# Patient Record
(demographics unavailable — no encounter records)

---

## 2025-07-15 NOTE — DATA REVIEWED
[de-identified] : Exam: MRI BRAIN Order#: [id number]    CLINICAL INDICATIONS: stroke  COMPARISON: Head CT dated 12/15/2023  TECHNIQUE: MRI brain: Multiplanar, multisequence MR imaging of the brain are obtained without the administration of intravenous Gadavist contrast.  FINDINGS: There is no abnormal restricted diffusion to suggest acute infarction. Scattered periventricular and subcortical white matter T2 /FLAIR hyperintensities are seen without mass effect, nonspecific, likely representing mild chronic microvascular changes. Normal T2 flow-voids are seen within the intracranial vasculature. The lateral ventricles and cortical sulci are age-appropriate in size and configuration. There is no mass, mass effect, or extra-axial fluid collection. There is no susceptibility artifact to suggest hemorrhage. Midline structures are normal. The visualized paranasal sinuses, mastoid air cells and orbits are unremarkable.    IMPRESSION: No acute infarction. [de-identified] : Exam: CT ANGIO BRAIN STROKE PROTC IC; CT ANGIO NECK STRO KE PROTCL IC; CT BRAIN PERFUSION MAPS STROKE; CT HEAD-CODE STROKE ED Order#: [id number]; [id number]; [id number]; [id number]     HEAD CT, CT PERFUSION, CTA OF THE [place] OF [place] AND NECK:  INDICATIONS: Neuro deficit, acute, stroke suspected. Dizziness and ataxia.  TECHNIQUE:  HEAD CT:  Serial axial images were obtained from the skull base to the vertex without the use of intravenous contrast. RAPID artificial intelligence was used for perfusion analysis and for preliminary evaluation of intracranial hemorrhage.  CTA [place] [facility] [place]:  After the intravenous power injection of non-ionic contrast material, serial thin sections were obtained through the intracranial circulation on a multislice CT scanner. Images were reformatted using a dedicated 3D software package and viewed on a dedicated workstation in multiple planes. MIP image analysis was performed on a separate workstation.  CTA NECK:  After the intravenous power injection of non-ionic contrast material, serial thin sections were obtained through the cervical circulation on a multislice CT scanner. Images were reformatted using a dedicated 3D software package and viewed on a dedicated workstation in multiple planes. MIP image analysis was performed on a separate workstation.  CONTRAST: 100 cc Isovue-[id number] was administered   COMPARISON EXAMINATION: None available.  FINDINGS:  VENTRICLES AND SULCI: Ventricles and sulci are unremarkable for patient age. INTRA-AXIAL: No intracranial mass, acute hemorrhage, or midline shift is present. Small focal region of hypoattenuation anterior to the left basal ganglia which may represent an age indeterminate infarction. EXTRA-AXIAL: No extra-axial fluid collection is present. INTRACRANIAL HEMORRHAGE: None.  VISUALIZED SINUSES: No air-fluid levels are identified. VISUALIZED MASTOIDS: Clear CALVARIUM: Intact   CT RAPID PERFUSION: None.  INFARCT CORE: 0 mL.  TISSUE AT RISK: 0 mL.  MISMATCH RATIO: None.   CTA [place] [facility] [place]:  ANTERIOR CIRCULATION  ICA CAVERNOUS, SUPRACLINOID, BIFURCATION SEGMENTS: Patent without flow limiting stenosis. The left posterior communicating artery is present.  ANTERIOR CEREBRAL ARTERIES: Bilateral A1, anterior communicating and A2 anterior cerebral arteries are unremarkable in course and caliber without flow limiting stenosis.  MIDDLE CEREBRAL ARTERIES: Patent bilateral M1, M2, and distal MCA branches without flow limiting stenosis.  POSTERIOR CIRCULATION:  VERTEBRAL ARTERIES: Patent without flow limiting stenosis  BASILAR ARTERY: Patent no flow limiting stenosis.  POSTERIOR CEREBRAL ARTERIES: Fetal origin of the left posterior cerebral artery. Patent without flow limiting stenosis.  CTA NECK:  GREAT VESSELS: Visualized segments are patent, no flow limiting stenosis.  COMMON CAROTID ARTERIES: RIGHT: Patent without flow limiting stenosis LEFT: Patent without flow limiting stenosis  INTERNAL CAROTID ARTERIES: RIGHT: Patent no evidence for any hemodynamically significant stenosis at the ICA origin by NASCET criteria. LEFT: Patent no evidence for any hemodynamically significant stenosis at the ICA origin by NASCET criteria.  VERTEBRAL ARTERIES:  RIGHT: Patent no evidence for any flow limiting stenosis. LEFT: Patent no evidence for any flow limiting stenosis.   SOFT TISSUES: Unremarkable  BONES: Unremarkable   IMPRESSION:  HEAD CT: No acute intracranial hemorrhage. Nonspecific small focus anterior to the left basal ganglia which may represent an age indeterminate lacunar infarct. If symptoms persist consider follow-up imaging with MRI of the brain if no contraindications.  Notification to clinician of alert: Physician assistant EVER AGUILERA was notified about the noncontrast head CT findings at 2:55 PM on 12/15/2023 with readback confirmation. The opportunity for questions was provided and all questions asked were answered.  CT PERFUSION demonstrated: No asymmetric or territorial perfusion abnormality.   CTA COW: Patent intracranial circulation without flow limiting stenosis or large vessel occlusion.  CTA NECK: Patent, ECAs, ICAs, no hemodynamically significant stenosis at ICA origins by NASCET criteria. Bilateral vertebral arteries are patent without flow limiting stenosis.

## 2025-07-15 NOTE — ASSESSMENT
[FreeTextEntry1] : Kezia Sultana is a 78 year old woman with a pertinent medical history of HLD, Osteoporosis, cataracts, GERD, IBSC, vertigo, presenting today for consultation and evaluation of memory changes. Accompanied by her sister at today's visit.  MMSE 24/30. Remaining neurological exam intact without lateralizing deficits. Some gait and balance instability, would recommend PT to improve and condition. Mild cognitive impairment noted on today's exam- notable word and name recall difficulty- generally not able to retrieve words even if given extra time. Also difficulty with copying interlocking pentagon. Given patients exam and family history would like to complete memory work-up to r/o neurodegenerative process.   Plan - Obtain lab work ordered today - MRI Brain dementia protocol [facility] - Neuropsychological testing referral provided - PT referral provided for gait and balance - Please get baseline EKG - Encouraged to incorporate daily exercise into routine, at least 30 min. Walking is great if able - Continue to remain socially, physically and mentally stimulated to maximize cognition. Limiting TV or screen time. Doing puzzles, games or reading. Picking up a new skill  - Continue to monitor vascular risk factors (BP, LDL, A1C). BP slightly elevated today 138/78. Continue to monitor  - Plan for follow up when work-up is complete with Zenia Becerra NP  For brain health - healthy eating/diet for memory cognition with a diet rich in fiber, fruits and vegetables and low in saturated fats, processed foods. - good sleep, 7-8 hours a night. No use of the phone or watching television before bed. - aerobic exercise, at least 30 minutes, such as a brisk walk 3-4 times a week. - Hydrate with at least 64 oz of water daily - keep your mind active with puzzles, reading, and socializing with others. - abstaining from excess alcohol, drug use. - blood pressure and cholesterol monitoring, blood glucose monitoring to prevent microvascular disease which can lead to cognitive impairment.

## 2025-07-15 NOTE — PHYSICAL EXAM
[FreeTextEntry1] : Physical examination  General: No acute distress, Awake, Alert.   Mental status  Awake, alert, and oriented to person, SOME time (forgot date -15th) and place, Some impairment in attention span and concentration- needed repetition on some instructions, Recent memory impairment- word and name recall difficulty and remote memory intact, Language intact, Fund of knowledge intact.  MMSE 24/30 President- Trump Word recall- 1/3 with hint Object recognition- 4/4  $1.50 6 quarters Spells world backwards unable to do serial 7's  Cranial Nerves  II: VFF  III, IV, VI: PERRL, EOMI.  V: Facial sensation is normal B/L.  VII: Facial strength is normal B/L.  VIII: Gross hearing is intact.  IX, X: Palate is midline and elevates symmetrically.  XI: Trapezius normal strength.  XII: Tongue midline without atrophy or fasciculations.   Motor exam  Muscle tone - no evidence of rigidity or resistance in all 4 extremities.  No atrophy or fasciculations  Muscle Strength: arms and legs, proximal and distal flexors and extensors are normal  5/5 No UE drift.  Reflexes  All present, normal, and symmetrical.  Plantars right: mute.  Plantars left: mute.   Coordination  Finger to nose: Mild end point tremor noted on L hand Heel to shin: Normal.   Sensory  Intact sensation to vibration and cold.  Gait  Mild kyphosis, normal based gate. Slow and cautious with turns

## 2025-07-15 NOTE — HISTORY OF PRESENT ILLNESS
[FreeTextEntry1] : Kezia Sultana is a 78 year old woman with a pertinent medical history of HLD, Osteoporosis, cataracts, GERD, IBSC, vertigo, presenting today for consultation and evaluation of memory changes. Accompanied by her sister at today's visit.  Kezia states she started to notice changes in her memory within the last year. She feels it has been declining rather quick. She states it is mostly short-term memory difficulty, she states she feels her long-term memory is intact. Most bothersome for short-memory changes is difficulty with word recall, name recall, misplacing objects. She feels when she is overwhelmed or may not understand instructions it will cause greater difficulty. She denies difficulty with remembering conversations or remote memory. Denies ever feeling like there has been a lapse of time where she doesn't remember what happened; no LOC. She is sleeping generally well 6 hours per night, not waking up confused or hallucinating. Denies snoring. She feels her balance and gait is a little unsteady at times, denies recent falls or injuries. Walks independently without a cane or walker. Currently she is able to perform all ADL's and iADL's independently without difficulty. She is managing her own finances without errors, attending scheduled appointments. Per her sister, she agrees with Kezia's concerns and has noted mostly impairment with recall. She also notes a couple of weeks ago Kezia forgot how to unlock her car door so she had to remind her how to do so. Kezia is still driving safely, only locally and usually attended by her sister. No MVA's or violations. Not getting lost. Denies difficulty with ability to complete tasks or stay concentrated. She states she is generally organized and will make lists for herself when she needs to, to help remember things- like grocery shopping list. Denies changes in her mood or behavior.  Allergies: NKDA Medications: no daily prescribed medications Supplements: Vitamin B complex, vitamin c, vitamin D3  Social: Living- Living in [place] in a house, living alone.   . 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 Denies illicit drug use. Denies alcohol consumption. Sleep/JEREMIAS: Sleeping 6hrs, waking up to use restroom. Not snoring  Driving:  Driving locally only, not during the night or dark. Sometimes needs to think about directions but not getting lost. No MVAs or violations. Exercise: Walking daily but limited when it's hot out  Mental stimulation: Reading, gardening  Hearing: No complaints   Labs: Reviewed in [facility]IE from 10/2024 Prior imaging: MRI Brain w/o